# Patient Record
Sex: FEMALE | Race: BLACK OR AFRICAN AMERICAN | NOT HISPANIC OR LATINO
[De-identification: names, ages, dates, MRNs, and addresses within clinical notes are randomized per-mention and may not be internally consistent; named-entity substitution may affect disease eponyms.]

---

## 2023-02-21 ENCOUNTER — APPOINTMENT (OUTPATIENT)
Dept: ORTHOPEDIC SURGERY | Facility: CLINIC | Age: 53
End: 2023-02-21
Payer: MEDICAID

## 2023-02-21 VITALS — HEIGHT: 65 IN | BODY MASS INDEX: 48.82 KG/M2 | WEIGHT: 293 LBS

## 2023-02-21 DIAGNOSIS — Z78.9 OTHER SPECIFIED HEALTH STATUS: ICD-10-CM

## 2023-02-21 DIAGNOSIS — Z86.79 PERSONAL HISTORY OF OTHER DISEASES OF THE CIRCULATORY SYSTEM: ICD-10-CM

## 2023-02-21 PROBLEM — Z00.00 ENCOUNTER FOR PREVENTIVE HEALTH EXAMINATION: Status: ACTIVE | Noted: 2023-02-21

## 2023-02-21 PROCEDURE — 20610 DRAIN/INJ JOINT/BURSA W/O US: CPT | Mod: 50

## 2023-02-21 PROCEDURE — 99204 OFFICE O/P NEW MOD 45 MIN: CPT | Mod: 25

## 2023-02-21 PROCEDURE — 73562 X-RAY EXAM OF KNEE 3: CPT | Mod: LT,RT

## 2023-02-21 PROCEDURE — 72110 X-RAY EXAM L-2 SPINE 4/>VWS: CPT

## 2023-02-21 RX ORDER — IBUPROFEN 600 MG/1
600 TABLET, FILM COATED ORAL
Qty: 90 | Refills: 0 | Status: ACTIVE | COMMUNITY
Start: 2023-02-15

## 2023-02-21 RX ORDER — LOSARTAN POTASSIUM AND HYDROCHLOROTHIAZIDE 25; 100 MG/1; MG/1
100-25 TABLET ORAL
Qty: 30 | Refills: 0 | Status: ACTIVE | COMMUNITY
Start: 2022-10-26

## 2023-02-21 RX ORDER — TRAMADOL HYDROCHLORIDE 50 MG/1
50 TABLET, COATED ORAL
Qty: 14 | Refills: 0 | Status: ACTIVE | COMMUNITY
Start: 2023-02-15

## 2023-02-21 RX ORDER — MEFLOQUINE HYDROCHLORIDE 250 MG/1
250 TABLET ORAL
Qty: 9 | Refills: 0 | Status: ACTIVE | COMMUNITY
Start: 2022-11-25

## 2023-02-21 RX ORDER — CELECOXIB 100 MG/1
100 CAPSULE ORAL
Qty: 30 | Refills: 0 | Status: ACTIVE | COMMUNITY
Start: 2023-02-15

## 2023-02-21 NOTE — ASSESSMENT
[FreeTextEntry1] : 52 year old female presents with acute exacerbation of chronic low back and bilateral knee pain. She denies radicular pain. She is neurologically intact.  She was given bilateral knee corticosteroid injections which she tolerated well. She was given a prescription for diclofenac. The patient was given a referral for physical therapy. She was given a handout for home exercises. She was given a referral to see Dr. Salty Barrera a total joints specialist. She will followup with me in 4-6 weeks. We discussed red flag symptoms that would require emergent evaluation. She knows to call with any questions or concerns or if her symptoms acutely worsen.

## 2023-02-21 NOTE — HISTORY OF PRESENT ILLNESS
[de-identified] : 52 year old female presents with acute exacerbation of chronic low back and bilateral knee pain. She denies radicular pain, recent illness, fevers, numbness, weakness, balance problems, saddle anesthesia, urinary retention or fecal incontinence. Activity makes the pain worse.  She was prescribed Tramadol by her PCP which helps.

## 2023-02-21 NOTE — PHYSICAL EXAM
[de-identified] : General: No acute distress, conversant, well-nourished.\par Head: Normocephalic, atraumatic\par Neck: trachea midline, FROM\par Heart: normotensive and normal rate and rhythm\par Lungs: No labored breathing\par Skin: No abrasions, no rashes, no edema\par Psych: Alert and oriented to person, place and time\par Extremities: no peripheral edema or digital cyanosis\par Gait: Normal gait. Can perform tandem gait.  \par Vascular: warm and well perfused distally, palpable distal pulses\par \par MSK:\par Bilateral Knee with no erythema, no effusion.  \par No tenderness to palpation of knee.\par No medial joint line tenderness.\par No lateral joint line tenderness.\par \par Range of motion: 0 - 130 degrees\par + pain with range of motion.\par \par Negative Amber's test.\par Stable to varus and valgus stress.\par Negative Lachman's test, negative anterior and posterior drawer tests.  \par \par Sensation intact to light touch.  \par Normal motor exam.\par Warm and well perfused distally.\par \par Lumbar spine:\par No tenderness to palpation.  No step-off, no deformity.\par \par NEURO EXAM:\par Sensation \par Left L2  -  2/2            \par Left L3  -  2/2\par Left L4  -  2/2\par Left L5  -  2/2\par Left S1  -  2/2\par \par Right L2  -  2/2            \par Right L3  -  2/2\par Right L4  -  2/2\par Right L5  -  2/2\par Right S1  -  2/2\par \par Motor: \par Left L2 (hip flexion)                            5/5                \par Left L3 (knee extension)                   5/5                \par Left L4 (ankle dorsiflexion)                 5/5                \par Left L5 (long toe extensor)                5/5                \par Left S1 (ankle plantar flexion)           5/5\par \par Right L2 (hip flexion)                            5/5                \par Right L3 (knee extension)                   5/5                \par Right L4 (ankle dorsiflexion)                 5/5                \par Right L5 (long toe extensor)                5/5                \par Right S1 (ankle plantar flexion)           5/5\par \par Reflexes: Normal and symmetric\par Negative clonus.  Down-going Babinski.   [de-identified] : I ordered radiographs to evaluate the patient's symptoms.\par \par Lumbar 4 view radiographs taken in the office today show no dislocation or fracture.  Lumbar spondylosis. L4-L5 spondylolisthesis with movement on dynamic series.\par \par Bilateral knee 3 view radiographs obtained in the office today shows no fracture or dislocation. Severe age-related degenerative changes.\par

## 2023-02-21 NOTE — PROCEDURE
[de-identified] : Procedure: Right Knee Joint injection with corticosteroid\par Pre-Procedure Diagnosis: Right knee pain\par Post-Procedure Diagnosis: same\par \par The patient was educated about the risks and benefits of a corticosteroid injection.  Alternatives were discussed.  The patient understood and consented for the procedure.\par \par The area was sterilely prepped using isopropyl alcohol.  An ethyl chloride spray provided  local anesthesia.  Using the usual sterile technique, 1 ml of 40mg/1ml of Kenalog and 4 ml of Lidocaine 1% without epinephrine was injected into the joint.  A dressing was applied to the area.  The patient tolerated the procedure well and without complication.\par \par Procedure: Left knee Joint injection with corticosteroid\par Pre-Procedure Diagnosis: left knee pain\par Post-Procedure Diagnosis: same\par \par The patient was educated about the risks and benefits of a corticosteroid injection.  Alternatives were discussed.  The patient understood and consented for the procedure.\par \par The area was sterilely prepped using isopropyl alcohol.  An ethyl chloride spray provided  local anesthesia.  Using the usual sterile technique, 1 ml of 40mg/1ml of Kenalog and 4 ml of Lidocaine 1% without epinephrine was injected into the joint.  A dressing was applied to the area.  The patient tolerated the procedure well and without complication.

## 2023-03-01 ENCOUNTER — RESULT REVIEW (OUTPATIENT)
Age: 53
End: 2023-03-01

## 2023-03-01 ENCOUNTER — OUTPATIENT (OUTPATIENT)
Dept: OUTPATIENT SERVICES | Facility: HOSPITAL | Age: 53
LOS: 1 days | End: 2023-03-01
Payer: MEDICAID

## 2023-03-01 ENCOUNTER — APPOINTMENT (OUTPATIENT)
Dept: ORTHOPEDIC SURGERY | Facility: CLINIC | Age: 53
End: 2023-03-01
Payer: MEDICAID

## 2023-03-01 VITALS
OXYGEN SATURATION: 99 % | HEIGHT: 65 IN | SYSTOLIC BLOOD PRESSURE: 174 MMHG | DIASTOLIC BLOOD PRESSURE: 93 MMHG | WEIGHT: 293 LBS | BODY MASS INDEX: 48.82 KG/M2 | HEART RATE: 88 BPM

## 2023-03-01 DIAGNOSIS — M17.10 UNILATERAL PRIMARY OSTEOARTHRITIS, UNSPECIFIED KNEE: ICD-10-CM

## 2023-03-01 DIAGNOSIS — Z72.3 LACK OF PHYSICAL EXERCISE: ICD-10-CM

## 2023-03-01 DIAGNOSIS — M25.561 PAIN IN RIGHT KNEE: ICD-10-CM

## 2023-03-01 DIAGNOSIS — M25.562 PAIN IN RIGHT KNEE: ICD-10-CM

## 2023-03-01 PROCEDURE — 73564 X-RAY EXAM KNEE 4 OR MORE: CPT

## 2023-03-01 PROCEDURE — 72170 X-RAY EXAM OF PELVIS: CPT | Mod: 26

## 2023-03-01 PROCEDURE — 72170 X-RAY EXAM OF PELVIS: CPT

## 2023-03-01 PROCEDURE — 99214 OFFICE O/P EST MOD 30 MIN: CPT

## 2023-03-01 PROCEDURE — 73564 X-RAY EXAM KNEE 4 OR MORE: CPT | Mod: 26,LT,RT

## 2023-03-02 PROBLEM — Z72.3 DOES NOT EXERCISE: Status: ACTIVE | Noted: 2023-03-01

## 2023-03-02 NOTE — PHYSICAL EXAM
[de-identified] :  General appearance: well nourished and hydrated, pleasant, alert and oriented x 3, cooperative.  Morbidly obese body habitus\par HEENT: normocephalic, EOM intact, wearing mask, external auditory canal clear.  \par Cardiovascular: no lower leg edema, no varicosities, dorsalis pedis pulses palpable and symmetric.  \par Lymphatics: no palpable lymphadenopathy, no lymphedema.  \par Neurologic: sensation is normal, no muscle weakness in upper or lower extremities, patella tendon reflexes present and symmetric.  \par Dermatologic: skin moist, warm, no rash.  \par Spine: cervical spine with normal lordosis and painless range of motion, thoracic spine with normal kyphosis and painless range of motion, lumbosacral spine with normal lordosis and painful restricted range of motion. \par Gait: b/l varus thrust. No assistive device\par \par Left knee: \par - Focal soft tissue swelling: none\par - Ecchymosis: none\par - Erythema: none\par - Effusion: small, no Baker's cyst\par - Wounds: none\par - Alignment: marked varus malalignment \par - Tenderness: medial joint line TTP\par - ROM: 3-110\par - Collateral laxity: none\par - Cruciate laxity: none\par - Popliteal angle (degrees): 20\par - Quad strength: 5/5\par \par Right knee:\par - Focal soft tissue swelling: none\par - Ecchymosis: none\par - Erythema: none\par - Effusion: small, no Baker's cyst\par - Wounds: none\par - Alignment: varus\par - Tenderness: lateral joint line TTP\par - ROM: 5-95 limited by pain and apprehension\par - Collateral laxity: none\par - Cruciate laxity: none\par - Popliteal angle (degrees): 20\par - Quad strength: 5/5 [de-identified] : AP pelvis and 4 views of the bilateral knees (weightbearing AP, weightbearing Sweet, weightbearing lateral, and Sunrise) were interpreted by me and reviewed with the patient.\par \par Location of imaging: [facility]\par Date of exam: 03/01/2023\par \par Pelvic alignment: relatively outlet positioning and obliquity with the right side up. \par Heterotopic bone is noted projecting superior to b/l greater trochanters. \par \par Right hip -- \par Arthritis: none\par Deformity: none\par Osteonecrosis: none\par \par Left hip -- \par Arthritis: none\par Deformity: none\par Osteonecrosis: none\par \par Right knee -- \par Alignment: varus with lateral tibial subluxation \par Arthritis: tricompartmental OA most pronounced medially with bone on bone articulation, KL 4\par Patellar height: normal\par Patellar tracking: central\par \par Left knee -- \par Alignment: varus with lateral tibial subluxation \par Arthritis: tricompartmental OA most pronounced medially with bone on bone articulation, KL 4\par Patellar height: normal\par Patellar tracking: central

## 2023-03-02 NOTE — ADDENDUM
[FreeTextEntry1] : Documented by Suzi Faust acting as a scribe for Dr. Salty Barrera on 03/01/2023.

## 2023-03-02 NOTE — END OF VISIT
[FreeTextEntry3] : All medical record entries made by the Scribe were at my, Dr. Salty Barrera, direction and personally dictated by me on 03/01/2023. I have reviewed the chart and agree that the record accurately reflects my personal performance of the history, physical exam, assessment and plan. I have also personally directed, reviewed, and agreed with the chart.

## 2023-03-02 NOTE — HISTORY OF PRESENT ILLNESS
[8] : a current pain level of 8/10 [Constant] : ~He/She~ states the symptoms seem to be constant [Bending] : worsened by bending [Walking] : worsened by walking [NSAIDs] : relieved by nonsteroidal anti-inflammatory drugs [de-identified] : 03/01/2023: 51 y/o Lebanese-speaking female referred by Dr. Villalba for evaluation of b/l knee pain. Patient states she has been having pain for at least 1 year, right worse than left. She localizes the pain to the anterior knee. Dr. Villalba administered bilateral knee CSI about 1 week ago and started her on oral diclofenac, which she reports has been giving her good relief. She has been ambulating with a cane and has not had formal PT in the past. \par \par She denies significant PMHx or past surgical history or allergies. She is employed as a hairdresser.

## 2023-03-02 NOTE — DISCUSSION/SUMMARY
[de-identified] : 51 y/o female with severe b/l knee OA in the setting of super morbid obesity. \par - We will continue with conservative management at this time, including PT, HEP, and I recommended that she continue with Tylenol and diclofenac as needed. I recommended that she do her best to begin losing weight through diet and exercise. We will help her with this by providing a referral to bariatrics. \par - I informed her that she would need to lose approximately 60-65 lbs to be considered a reasonable candidate for TKA. Given her young age, we will attempt to hold off on surgery for as long as possible in favor of conservative means. \par - She will f/u in late May 2023 for reevaluation and likely repeat b/l CSI with no new XR needed. She should be reweighed at every visit.

## 2023-03-21 ENCOUNTER — APPOINTMENT (OUTPATIENT)
Dept: ORTHOPEDIC SURGERY | Facility: CLINIC | Age: 53
End: 2023-03-21
Payer: MEDICAID

## 2023-03-21 PROCEDURE — 99214 OFFICE O/P EST MOD 30 MIN: CPT

## 2023-03-21 NOTE — REASON FOR VISIT
[Follow-Up Visit] : a follow-up visit for [Back Pain] : back pain [Other: ____] : [unfilled] [FreeTextEntry2] : Pains have subsided. Knee injections with relief

## 2023-03-21 NOTE — PHYSICAL EXAM
[de-identified] : General: No acute distress, conversant, well-nourished.\par Head: Normocephalic, atraumatic\par Neck: trachea midline, FROM\par Heart: normotensive and normal rate and rhythm\par Lungs: No labored breathing\par Skin: No abrasions, no rashes, no edema\par Psych: Alert and oriented to person, place and time\par Extremities: no peripheral edema or digital cyanosis\par Gait: Normal gait. Can perform tandem gait.  \par Vascular: warm and well perfused distally, palpable distal pulses\par \par MSK:\par Bilateral Knee with no erythema, no effusion.  \par No tenderness to palpation of knee.\par No medial joint line tenderness.\par No lateral joint line tenderness.\par \par Range of motion: 0 - 130 degrees\par No pain with range of motion.\par \par Negative Amber's test.\par Stable to varus and valgus stress.\par Negative Lachman's test, negative anterior and posterior drawer tests.  \par \par Sensation intact to light touch.  \par Normal motor exam.\par Warm and well perfused distally.\par \par Lumbar spine:\par No tenderness to palpation.  No step-off, no deformity.\par \par NEURO EXAM:\par Sensation \par Left L2  -  2/2            \par Left L3  -  2/2\par Left L4  -  2/2\par Left L5  -  2/2\par Left S1  -  2/2\par \par Right L2  -  2/2            \par Right L3  -  2/2\par Right L4  -  2/2\par Right L5  -  2/2\par Right S1  -  2/2\par \par Motor: \par Left L2 (hip flexion)                            5/5                \par Left L3 (knee extension)                   5/5                \par Left L4 (ankle dorsiflexion)                 5/5                \par Left L5 (long toe extensor)                5/5                \par Left S1 (ankle plantar flexion)           5/5\par \par Right L2 (hip flexion)                            5/5                \par Right L3 (knee extension)                   5/5                \par Right L4 (ankle dorsiflexion)                 5/5                \par Right L5 (long toe extensor)                5/5                \par Right S1 (ankle plantar flexion)           5/5\par \par Reflexes: Normal and symmetric\par Negative clonus.  Down-going Babinski.   [de-identified] : Lumbar 4 view radiographs (2/21/23) no dislocation or fracture.  Lumbar spondylosis. L4-L5 spondylolisthesis with movement on dynamic series.\par \par Bilateral knee 3 view radiographs (2/21/23) no fracture or dislocation. Severe age-related degenerative changes.\par

## 2023-03-21 NOTE — HISTORY OF PRESENT ILLNESS
[de-identified] : 52 year old female followup with acute exacerbation of chronic low back and bilateral knee pain. She denies radicular pain, recent illness, fevers, numbness, weakness, balance problems, saddle anesthesia, urinary retention or fecal incontinence. Since her last visit she reports great relief after her bilateral knee CSIs.  She saw Dr. Barrera to discuss TKA. She has been taking diclofenac as needed with good relief. She is happy with her progress. Her back pain is also improved.

## 2023-03-21 NOTE — ASSESSMENT
[FreeTextEntry1] : 52 year old female followup with acute exacerbation of chronic low back and bilateral knee pain. She denies radicular pain. She is neurologically intact. Since her last visit she reports great relief after her bilateral knee CSIs.  She saw Dr. Barrera to discuss TKA. She will start PT. The patient was given a referral for physical therapy. She can continue diclofenac.  She will followup with me in 2 months. We discussed red flag symptoms that would require emergent evaluation. She knows to call with any questions or concerns or if her symptoms acutely worsen.

## 2023-04-19 ENCOUNTER — RX RENEWAL (OUTPATIENT)
Age: 53
End: 2023-04-19

## 2023-05-17 ENCOUNTER — APPOINTMENT (OUTPATIENT)
Dept: ORTHOPEDIC SURGERY | Facility: CLINIC | Age: 53
End: 2023-05-17
Payer: MEDICAID

## 2023-05-17 VITALS
HEIGHT: 65 IN | HEART RATE: 83 BPM | OXYGEN SATURATION: 97 % | SYSTOLIC BLOOD PRESSURE: 115 MMHG | WEIGHT: 286 LBS | BODY MASS INDEX: 47.65 KG/M2 | DIASTOLIC BLOOD PRESSURE: 75 MMHG

## 2023-05-17 PROCEDURE — 20610 DRAIN/INJ JOINT/BURSA W/O US: CPT

## 2023-05-18 NOTE — END OF VISIT
[FreeTextEntry3] : All medical record entries made by the Scribe were at my, Dr. Salty Barrera, direction and personally dictated by me on 05/17/2023. I have reviewed the chart and agree that the record accurately reflects my personal performance of the history, physical exam, assessment and plan. I have also personally directed, reviewed, and agreed with the chart.

## 2023-05-18 NOTE — END OF VISIT
[FreeTextEntry3] : All medical record entries made by the Scribe were at my, Dr. Salyt Barrera, direction and personally dictated by me on 05/17/2023. I have reviewed the chart and agree that the record accurately reflects my personal performance of the history, physical exam, assessment and plan. I have also personally directed, reviewed, and agreed with the chart.

## 2023-05-18 NOTE — ADDENDUM
[FreeTextEntry1] : Documented by Suzi Faust acting as a scribe for Dr. Salty Barrera on 05/17/2023.

## 2023-05-18 NOTE — PROCEDURE
[de-identified] : 53 y/o female f/u for b/l knee OA. She reports she has been continuing to stay active physically with PT and her own walking regimen. She has been losing weight through diet alone and has lost over 20 lbs since our last meeting. Overall she is feeling better and more functional than she was 3 months ago. She does note that the CSI have started to wear off and she would like to repeat CSI again today. \par \par Procedure: Knee joint injection\par Laterality: bilateral \par Indication: Osteoarthritis - discussed with patient\par Skin prep: alcohol and chlorhexidine\par Anesthesia: ethyl chloride spray\par Needle: 20-gauge\par Portal: inferolateral\par Aspiration: none\par Injectate: 2cc of 2% lidocaine, 2cc of 0.5% bupivacaine, and 1cc of 40mg/mL triamcinolone\par Dressing: Band-aid\par Complications: None \par \par PLAN\par - I recommended that she continue with exercise based modalities. She was provided with a new PT referral today as well as a new copy of the [facility] knee conditioning program. \par - She will f/u in 3 months with no new XR needed for her next round of CSI. \par - She should continue to be reweighed at every visit. When her BMI falls below 40, depending on her symptoms at that time, we may be able to discuss staged bilateral TKA.

## 2023-05-18 NOTE — PROCEDURE
[de-identified] : 51 y/o female f/u for b/l knee OA. She reports she has been continuing to stay active physically with PT and her own walking regimen. She has been losing weight through diet alone and has lost over 20 lbs since our last meeting. Overall she is feeling better and more functional than she was 3 months ago. She does note that the CSI have started to wear off and she would like to repeat CSI again today. \par \par Procedure: Knee joint injection\par Laterality: bilateral \par Indication: Osteoarthritis - discussed with patient\par Skin prep: alcohol and chlorhexidine\par Anesthesia: ethyl chloride spray\par Needle: 20-gauge\par Portal: inferolateral\par Aspiration: none\par Injectate: 2cc of 2% lidocaine, 2cc of 0.5% bupivacaine, and 1cc of 40mg/mL triamcinolone\par Dressing: Band-aid\par Complications: None \par \par PLAN\par - I recommended that she continue with exercise based modalities. She was provided with a new PT referral today as well as a new copy of the [facility] knee conditioning program. \par - She will f/u in 3 months with no new XR needed for her next round of CSI. \par - She should continue to be reweighed at every visit. When her BMI falls below 40, depending on her symptoms at that time, we may be able to discuss staged bilateral TKA.

## 2023-06-14 ENCOUNTER — APPOINTMENT (OUTPATIENT)
Dept: ORTHOPEDIC SURGERY | Facility: CLINIC | Age: 53
End: 2023-06-14
Payer: MEDICAID

## 2023-06-14 PROCEDURE — 99214 OFFICE O/P EST MOD 30 MIN: CPT

## 2023-06-14 RX ORDER — DICLOFENAC SODIUM 75 MG/1
75 TABLET, DELAYED RELEASE ORAL
Qty: 60 | Refills: 1 | Status: ACTIVE | COMMUNITY
Start: 2023-06-14 | End: 1900-01-01

## 2023-06-26 NOTE — REASON FOR VISIT
[Follow-Up Visit] : a follow-up visit for [Back Pain] : back pain [Other: ____] : [unfilled] [FreeTextEntry2] : PT with relief

## 2023-06-26 NOTE — ASSESSMENT
[FreeTextEntry1] : 52 year old female followup with acute exacerbation of chronic low back and bilateral knee pain. She denies radicular pain. She is neurologically intact. She continues to have good relief of her knee pain after injection. She will follow with Dr. Barrera for her knees.   Her back pain has improved with PT.  She is happy with her progress. She will continue PT. The patient was given a referral for physical therapy. She can take diclofenac as needed. She will followup in 6-8 weeks. We discussed red flag symptoms that would require emergent evaluation. She knows to call with any questions or concerns or if her symptoms acutely worsen.

## 2023-06-26 NOTE — HISTORY OF PRESENT ILLNESS
[de-identified] : 52 year old female followup with acute exacerbation of chronic low back and bilateral knee pain. She denies radicular pain, recent illness, fevers, numbness, weakness, balance problems, saddle anesthesia, urinary retention or fecal incontinence. She continues to have good relief of her knee pain after injections and is followed by Dr. Barrera.   Her back pain has improved with PT.  She is happy with her progress.

## 2023-06-26 NOTE — PHYSICAL EXAM
[de-identified] : General: No acute distress, conversant, well-nourished.\par Head: Normocephalic, atraumatic\par Neck: trachea midline, FROM\par Heart: normotensive and normal rate and rhythm\par Lungs: No labored breathing\par Skin: No abrasions, no rashes, no edema\par Psych: Alert and oriented to person, place and time\par Extremities: no peripheral edema or digital cyanosis\par Gait: Normal gait. Can perform tandem gait.  \par Vascular: warm and well perfused distally, palpable distal pulses\par \par MSK:\par Bilateral Knee with no erythema, no effusion.  \par No tenderness to palpation of knee.\par No medial joint line tenderness.\par No lateral joint line tenderness.\par \par Range of motion: 0 - 130 degrees\par No pain with range of motion.\par \par Negative Amber's test.\par Stable to varus and valgus stress.\par Negative Lachman's test, negative anterior and posterior drawer tests.  \par \par Sensation intact to light touch.  \par Normal motor exam.\par Warm and well perfused distally.\par \par Lumbar spine:\par No tenderness to palpation.  No step-off, no deformity.\par \par NEURO EXAM:\par Sensation \par Left L2  -  2/2            \par Left L3  -  2/2\par Left L4  -  2/2\par Left L5  -  2/2\par Left S1  -  2/2\par \par Right L2  -  2/2            \par Right L3  -  2/2\par Right L4  -  2/2\par Right L5  -  2/2\par Right S1  -  2/2\par \par Motor: \par Left L2 (hip flexion)                            5/5                \par Left L3 (knee extension)                   5/5                \par Left L4 (ankle dorsiflexion)                 5/5                \par Left L5 (long toe extensor)                5/5                \par Left S1 (ankle plantar flexion)           5/5\par \par Right L2 (hip flexion)                            5/5                \par Right L3 (knee extension)                   5/5                \par Right L4 (ankle dorsiflexion)                 5/5                \par Right L5 (long toe extensor)                5/5                \par Right S1 (ankle plantar flexion)           5/5\par \par Reflexes: Normal and symmetric\par Negative clonus.  Down-going Babinski.   [de-identified] : Lumbar 4 view radiographs (2/21/23) no dislocation or fracture.  Lumbar spondylosis. L4-L5 spondylolisthesis with movement on dynamic series.\par \par Bilateral knee 3 view radiographs (2/21/23) no fracture or dislocation. Severe age-related degenerative changes.\par

## 2023-08-15 ENCOUNTER — APPOINTMENT (OUTPATIENT)
Dept: ORTHOPEDIC SURGERY | Facility: CLINIC | Age: 53
End: 2023-08-15
Payer: MEDICAID

## 2023-08-15 DIAGNOSIS — M54.50 LOW BACK PAIN, UNSPECIFIED: ICD-10-CM

## 2023-08-15 DIAGNOSIS — M43.16 SPONDYLOLISTHESIS, LUMBAR REGION: ICD-10-CM

## 2023-08-15 PROCEDURE — 99214 OFFICE O/P EST MOD 30 MIN: CPT | Mod: 25

## 2023-08-15 PROCEDURE — 20610 DRAIN/INJ JOINT/BURSA W/O US: CPT | Mod: 50

## 2023-08-15 RX ORDER — DICLOFENAC SODIUM 1% 10 MG/G
1 GEL TOPICAL
Qty: 1 | Refills: 0 | Status: ACTIVE | COMMUNITY
Start: 2023-08-15 | End: 1900-01-01

## 2023-08-15 NOTE — ASSESSMENT
[FreeTextEntry1] : 52 year old female followup with acute exacerbation of chronic low back and bilateral knee pain. She denies radicular pain. She is neurologically intact. Her back pain has improved with PT.  Her knee pain has returned, and she would like new injections.  She was given bilateral knee injections today which she tolerated well.  She can continue PT.  She can continue diclofenac. She will followup in 3 months.  We discussed red flag symptoms that would require emergent evaluation. She knows to call with any questions or concerns or if her symptoms acutely worsen.

## 2023-08-15 NOTE — REASON FOR VISIT
[Follow-Up Visit] : a follow-up visit for [Back Pain] : back pain [Other: ____] : [unfilled] [FreeTextEntry2] : PT would like new knee injections

## 2023-08-15 NOTE — HISTORY OF PRESENT ILLNESS
[de-identified] : 52 year old female followup with acute exacerbation of chronic low back and bilateral knee pain. She denies radicular pain, recent illness, fevers, numbness, weakness, balance problems, saddle anesthesia, urinary retention or fecal incontinence.  Her back pain has improved with PT.  Her knee pain has returned and she would like new injections.

## 2023-08-15 NOTE — PROCEDURE
[de-identified] : Procedure: Right Knee Joint injection with corticosteroid\par  Pre-Procedure Diagnosis: Right knee pain\par  Post-Procedure Diagnosis: same\par  \par  The patient was educated about the risks and benefits of a corticosteroid injection.  Alternatives were discussed.  The patient understood and consented for the procedure.\par  \par  The area was sterilely prepped using isopropyl alcohol.  An ethyl chloride spray provided  local anesthesia.  Using the usual sterile technique, 1 ml of 40mg/1ml of Kenalog and 4 ml of Lidocaine 1% without epinephrine was injected into the joint.  A dressing was applied to the area.  The patient tolerated the procedure well and without complication.\par  \par  Procedure: Left knee Joint injection with corticosteroid\par  Pre-Procedure Diagnosis: left knee pain\par  Post-Procedure Diagnosis: same\par  \par  The patient was educated about the risks and benefits of a corticosteroid injection.  Alternatives were discussed.  The patient understood and consented for the procedure.\par  \par  The area was sterilely prepped using isopropyl alcohol.  An ethyl chloride spray provided  local anesthesia.  Using the usual sterile technique, 1 ml of 40mg/1ml of Kenalog and 4 ml of Lidocaine 1% without epinephrine was injected into the joint.  A dressing was applied to the area.  The patient tolerated the procedure well and without complication.

## 2023-08-15 NOTE — PHYSICAL EXAM
[de-identified] : General: No acute distress, conversant, well-nourished. Head: Normocephalic, atraumatic Neck: trachea midline, FROM Heart: normotensive and normal rate and rhythm Lungs: No labored breathing Skin: No abrasions, no rashes, no edema Psych: Alert and oriented to person, place and time Extremities: no peripheral edema or digital cyanosis Gait: Normal gait. Can perform tandem gait.   Vascular: warm and well perfused distally, palpable distal pulses  MSK: Bilateral Knee with no erythema, no effusion.   No tenderness to palpation of knee. No medial joint line tenderness. No lateral joint line tenderness.  Range of motion: 0 - 130 degrees No pain with range of motion.  Negative Amber's test. Stable to varus and valgus stress. Negative Lachman's test, negative anterior and posterior drawer tests.    Sensation intact to light touch.   Normal motor exam. Warm and well perfused distally.  Lumbar spine: No tenderness to palpation.  No step-off, no deformity.  NEURO EXAM: Sensation  Left L2  -  2/2             Left L3  -  2/2 Left L4  -  2/2 Left L5  -  2/2 Left S1  -  2/2  Right L2  -  2/2             Right L3  -  2/2 Right L4  -  2/2 Right L5  -  2/2 Right S1  -  2/2  Motor:  Left L2 (hip flexion)                            5/5                 Left L3 (knee extension)                   5/5                 Left L4 (ankle dorsiflexion)                 5/5                 Left L5 (long toe extensor)                5/5                 Left S1 (ankle plantar flexion)           5/5  Right L2 (hip flexion)                            5/5                 Right L3 (knee extension)                   5/5                 Right L4 (ankle dorsiflexion)                 5/5                 Right L5 (long toe extensor)                5/5                 Right S1 (ankle plantar flexion)           5/5  Reflexes: Normal and symmetric Negative clonus.  Down-going Babinski.   [de-identified] : Lumbar 4 view radiographs (2/21/23) no dislocation or fracture.  Lumbar spondylosis. L4-L5 spondylolisthesis with movement on dynamic series.  Bilateral knee 3 view radiographs (2/21/23) no fracture or dislocation. Severe age-related degenerative changes.

## 2023-09-13 ENCOUNTER — APPOINTMENT (OUTPATIENT)
Dept: ORTHOPEDIC SURGERY | Facility: CLINIC | Age: 53
End: 2023-09-13
Payer: MEDICAID

## 2023-09-13 VITALS
HEART RATE: 76 BPM | DIASTOLIC BLOOD PRESSURE: 85 MMHG | OXYGEN SATURATION: 99 % | WEIGHT: 274 LBS | BODY MASS INDEX: 45.65 KG/M2 | HEIGHT: 65 IN | SYSTOLIC BLOOD PRESSURE: 135 MMHG

## 2023-09-13 PROCEDURE — 99213 OFFICE O/P EST LOW 20 MIN: CPT

## 2023-09-13 RX ORDER — DICLOFENAC SODIUM 75 MG/1
75 TABLET, DELAYED RELEASE ORAL
Qty: 60 | Refills: 2 | Status: ACTIVE | COMMUNITY
Start: 2023-02-21 | End: 1900-01-01

## 2023-09-13 RX ORDER — HYALURONATE SODIUM 20 MG/2 ML
20 SYRINGE (ML) INTRAARTICULAR
Qty: 2 | Refills: 0 | Status: ACTIVE | OUTPATIENT
Start: 2023-09-13

## 2023-11-15 ENCOUNTER — APPOINTMENT (OUTPATIENT)
Dept: ORTHOPEDIC SURGERY | Facility: CLINIC | Age: 53
End: 2023-11-15
Payer: MEDICAID

## 2023-11-15 ENCOUNTER — APPOINTMENT (OUTPATIENT)
Dept: ORTHOPEDIC SURGERY | Facility: CLINIC | Age: 53
End: 2023-11-15

## 2023-11-15 VITALS
OXYGEN SATURATION: 98 % | HEIGHT: 65 IN | DIASTOLIC BLOOD PRESSURE: 84 MMHG | SYSTOLIC BLOOD PRESSURE: 151 MMHG | WEIGHT: 274 LBS | HEART RATE: 68 BPM | BODY MASS INDEX: 45.65 KG/M2

## 2023-11-15 PROCEDURE — 20610 DRAIN/INJ JOINT/BURSA W/O US: CPT | Mod: 50

## 2023-11-22 RX ORDER — DICLOFENAC SODIUM 75 MG/1
75 TABLET, DELAYED RELEASE ORAL
Qty: 60 | Refills: 2 | Status: ACTIVE | COMMUNITY
Start: 2023-08-15 | End: 1900-01-01

## 2023-12-12 ENCOUNTER — APPOINTMENT (OUTPATIENT)
Dept: ORTHOPEDIC SURGERY | Facility: CLINIC | Age: 53
End: 2023-12-12
Payer: MEDICAID

## 2023-12-12 VITALS
HEIGHT: 65 IN | HEART RATE: 67 BPM | BODY MASS INDEX: 45.48 KG/M2 | DIASTOLIC BLOOD PRESSURE: 96 MMHG | SYSTOLIC BLOOD PRESSURE: 161 MMHG | OXYGEN SATURATION: 97 % | WEIGHT: 273 LBS

## 2023-12-12 PROCEDURE — 20610 DRAIN/INJ JOINT/BURSA W/O US: CPT | Mod: 50

## 2023-12-19 ENCOUNTER — APPOINTMENT (OUTPATIENT)
Dept: ORTHOPEDIC SURGERY | Facility: CLINIC | Age: 53
End: 2023-12-19
Payer: MEDICAID

## 2023-12-19 VITALS
BODY MASS INDEX: 45.48 KG/M2 | HEART RATE: 81 BPM | OXYGEN SATURATION: 97 % | HEIGHT: 65 IN | DIASTOLIC BLOOD PRESSURE: 101 MMHG | SYSTOLIC BLOOD PRESSURE: 152 MMHG | WEIGHT: 273 LBS

## 2023-12-19 PROCEDURE — 20610 DRAIN/INJ JOINT/BURSA W/O US: CPT | Mod: 50

## 2023-12-19 NOTE — PROCEDURE
[de-identified] : Procedure: Knee joint injection Laterality:  BILATERAL Indication: Osteoarthritis - discussed with patient Skin prep: alcohol and chlorhexidine Anesthesia: ethyl chloride spray Needle: 20-gauge Portal: inferolateral Aspiration: none Injectate: Euflexxa 2/3 Dressing: Band-aid Complications: None  - RTC in 1 week to complete BILATERAL KNEE Euflexxa injection  Euflexxa Injection- Bilateral Knee Joint 2 out of 3 Lot: [id number] Exp: 2024-11-26 Man: Ana Ndc: [id number]

## 2023-12-26 ENCOUNTER — APPOINTMENT (OUTPATIENT)
Dept: ORTHOPEDIC SURGERY | Facility: CLINIC | Age: 53
End: 2023-12-26
Payer: MEDICAID

## 2023-12-26 VITALS
BODY MASS INDEX: 45.48 KG/M2 | HEIGHT: 65 IN | DIASTOLIC BLOOD PRESSURE: 102 MMHG | HEART RATE: 85 BPM | SYSTOLIC BLOOD PRESSURE: 152 MMHG | OXYGEN SATURATION: 97 % | WEIGHT: 273 LBS

## 2023-12-26 DIAGNOSIS — M17.0 BILATERAL PRIMARY OSTEOARTHRITIS OF KNEE: ICD-10-CM

## 2023-12-26 PROCEDURE — 20610 DRAIN/INJ JOINT/BURSA W/O US: CPT | Mod: 50

## 2023-12-27 NOTE — PROCEDURE
[de-identified] : Euflexxa Injection- Bilateral Knee Joint 3 out of 3 Lot: [id number] Exp: 2024-11-26 Man: Ana Ndc: [id number].  Procedure: Knee joint injection Laterality: BILATERAL Indication: Osteoarthritis - discussed with patient Skin prep: alcohol and chlorhexidine Anesthesia: ethyl chloride spray Needle: 20-gauge Portal: inferolateral Aspiration: none Injectate: Euflexxa 3/3 Dressing: Band-aid Complications: None  - Advised patient to contact the office in 5 months to submit authorization for BILATERAL knee Euflexxa injections